# Patient Record
Sex: MALE
[De-identification: names, ages, dates, MRNs, and addresses within clinical notes are randomized per-mention and may not be internally consistent; named-entity substitution may affect disease eponyms.]

---
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
## 2024-03-15 ENCOUNTER — APPOINTMENT (OUTPATIENT)
Dept: OTHER | Facility: HOSPITAL | Age: 24
End: 2024-03-15
Attending: PREVENTIVE MEDICINE

## (undated) NOTE — ED AVS SNAPSHOT
Osmel Charles   MRN: [id number]    Department:  THE [facility] Emergency Department in [place]   Date of Visit:  9/14/2018           Disclosure     Insurance plans vary and the physician(s) referred by the ER may not be covered by your plan.  Please contact y tell this physician (or your personal doctor if your instructions are to return to your personal doctor) about any new or lasting problems. The primary care or specialist physician will see patients referred from the [facility] Emergency Department.  Wing Brandon

## (undated) NOTE — ED AVS SNAPSHOT
Isamar Gibbons   MRN: [id number]    Department:  [facility] Emergency Department   Date of Visit:  9/17/2018           Disclosure     Insurance plans vary and the physician(s) referred by the ER may not be covered by your plan.  Please contact your i tell this physician (or your personal doctor if your instructions are to return to your personal doctor) about any new or lasting problems. The primary care or specialist physician will see patients referred from the [facility] Emergency Department.  Verónica Fabian